# Patient Record
Sex: MALE | Race: WHITE | URBAN - METROPOLITAN AREA
[De-identification: names, ages, dates, MRNs, and addresses within clinical notes are randomized per-mention and may not be internally consistent; named-entity substitution may affect disease eponyms.]

---

## 2024-06-13 ENCOUNTER — OFFICE VISIT (OUTPATIENT)
Dept: URBAN - METROPOLITAN AREA CLINIC 92 | Facility: CLINIC | Age: 64
End: 2024-06-13
Payer: COMMERCIAL

## 2024-06-13 ENCOUNTER — DASHBOARD ENCOUNTERS (OUTPATIENT)
Age: 64
End: 2024-06-13

## 2024-06-13 VITALS
BODY MASS INDEX: 24.78 KG/M2 | WEIGHT: 187 LBS | TEMPERATURE: 97 F | SYSTOLIC BLOOD PRESSURE: 122 MMHG | DIASTOLIC BLOOD PRESSURE: 74 MMHG | HEIGHT: 73 IN | HEART RATE: 83 BPM

## 2024-06-13 DIAGNOSIS — R11.2 NAUSEA AND VOMITING IN ADULT: ICD-10-CM

## 2024-06-13 DIAGNOSIS — R10.84 ABDOMINAL CRAMPING, GENERALIZED: ICD-10-CM

## 2024-06-13 DIAGNOSIS — R19.7 ACUTE DIARRHEA: ICD-10-CM

## 2024-06-13 PROCEDURE — 99204 OFFICE O/P NEW MOD 45 MIN: CPT

## 2024-06-13 NOTE — HPI-TODAY'S VISIT:
Patient is a 63 year old male with a PMH at CAD who presents for abdominal pain.  Patient had 90% blockage, two stents placed at age 57. Mother passed due to a CVA. Cardiologist Dr. Ruiz at [place].   Patient reports around Christmas/ New Years he developed stomach pains, weight loss, diarrhea, nausea, and vomiting. Symptoms resolves then 4 weeks ago after a [place] trip he developed diarrhea and vomiting. This past Monday, same symptoms occurred during the night. Did have diarrhea leading up to this most recent episode.  Notes of blood in stool a week ago, one episode. Noted of upper abdominal discomfort. Denies melena. Went to urgent care last night. X-ray normal. Patient had some diarrhea yesterday but is improving. Takes Benadryl frequently. Drinks wine occasionally. Denies tobacco or illicit drug use.   Last colonoscopy within the last 5 years. Never had an EGD. Denies fmhx of GI cancers. Patient takes Aspirin. Patient denies any cardiac, lung, or kidney issues. No pacemaker/defibrillator, No home O2. No dialysis.

## 2024-06-17 ENCOUNTER — TELEPHONE ENCOUNTER (OUTPATIENT)
Dept: URBAN - METROPOLITAN AREA CLINIC 92 | Facility: CLINIC | Age: 64
End: 2024-06-17

## 2024-06-19 ENCOUNTER — TELEPHONE ENCOUNTER (OUTPATIENT)
Dept: URBAN - METROPOLITAN AREA CLINIC 92 | Facility: CLINIC | Age: 64
End: 2024-06-19

## 2024-06-19 LAB
A/G RATIO: 1.8
ABSOLUTE BASOPHILS: 31
ABSOLUTE EOSINOPHILS: 20
ABSOLUTE LYMPHOCYTES: 1876
ABSOLUTE MONOCYTES: 413
ABSOLUTE NEUTROPHILS: 1560
ALBUMIN: 4.2
ALKALINE PHOSPHATASE: 61
ALT (SGPT): 23
AST (SGOT): 17
BASOPHILS: 0.8
BILIRUBIN, TOTAL: 0.4
BUN/CREATININE RATIO: (no result)
BUN: 12
C-REACTIVE PROTEIN, QUANT: 11.1
CALCIUM: 9
CARBON DIOXIDE, TOTAL: 24
CHLORIDE: 104
CREATININE: 0.96
EGFR: 89
EOSINOPHILS: 0.5
GLOBULIN, TOTAL: 2.4
GLUCOSE: 99
H PYLORI BREATH TEST: DETECTED
HEMATOCRIT: 42
HEMOGLOBIN: 14.3
LIPASE: 63
LYMPHOCYTES: 48.1
MCH: 31.8
MCHC: 34
MCV: 93.3
MONOCYTES: 10.6
MPV: 8.9
NEUTROPHILS: 40
PLATELET COUNT: 211
POTASSIUM: 4.4
PROTEIN, TOTAL: 6.6
RDW: 12.7
RED BLOOD CELL COUNT: 4.5
SODIUM: 138
WHITE BLOOD CELL COUNT: 3.9

## 2024-06-19 RX ORDER — TETRACYCLINE HYDROCHLORIDE 500 MG/1
1 CAPSULE CAPSULE ORAL
Qty: 56 CAPSULE | Refills: 0 | OUTPATIENT
Start: 2024-06-19 | End: 2024-07-03

## 2024-06-19 RX ORDER — METRONIDAZOLE 250 MG/1
1 TABLET TABLET ORAL
Qty: 56 TABLET | Refills: 0 | OUTPATIENT
Start: 2024-06-19 | End: 2024-07-03

## 2024-06-19 RX ORDER — BISMUTH SUBSALICYLATE 262 MG/1
2 TABLETS WITH MEALS AND AT BEDTIME TABLET, CHEWABLE ORAL
Qty: 112 TABLET | Refills: 0 | OUTPATIENT
Start: 2024-06-19 | End: 2024-07-03

## 2024-06-19 RX ORDER — PANTOPRAZOLE SODIUM 40 MG/1
1 TABLET TABLET, DELAYED RELEASE ORAL TWICE A DAY
Qty: 28 TABLET | Refills: 0 | OUTPATIENT
Start: 2024-06-19

## 2024-06-24 ENCOUNTER — TELEPHONE ENCOUNTER (OUTPATIENT)
Dept: URBAN - METROPOLITAN AREA CLINIC 42 | Facility: CLINIC | Age: 64
End: 2024-06-24

## 2024-06-27 ENCOUNTER — TELEPHONE ENCOUNTER (OUTPATIENT)
Dept: URBAN - METROPOLITAN AREA CLINIC 92 | Facility: CLINIC | Age: 64
End: 2024-06-27

## 2024-07-31 ENCOUNTER — LAB OUTSIDE AN ENCOUNTER (OUTPATIENT)
Dept: URBAN - METROPOLITAN AREA CLINIC 92 | Facility: CLINIC | Age: 64
End: 2024-07-31